# Patient Record
Sex: FEMALE | Race: WHITE | NOT HISPANIC OR LATINO | Employment: STUDENT | URBAN - METROPOLITAN AREA
[De-identification: names, ages, dates, MRNs, and addresses within clinical notes are randomized per-mention and may not be internally consistent; named-entity substitution may affect disease eponyms.]

---
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
## 2023-01-30 ENCOUNTER — HEALTH MAINTENANCE LETTER (OUTPATIENT)
Age: 18
End: 2023-01-30

## 2023-03-03 ENCOUNTER — VIRTUAL VISIT (OUTPATIENT)
Dept: PLASTIC SURGERY | Facility: CLINIC | Age: 18
End: 2023-03-03
Payer: COMMERCIAL

## 2023-03-03 DIAGNOSIS — Z98.890 POSTOPERATIVE STATE: Primary | ICD-10-CM

## 2023-03-03 NOTE — LETTER
3/3/2023       RE: Socorro Giang  [address] [facility] [address]       Dear Colleague,    Thank you for referring your patient, Socorro Giang, to the [facility] at [facility]. Please see a copy of my visit note below.    Facial Plastic and Reconstructive Surgery      Socorro Giang is doing well. She is healing appropriately from nasal surgery.       Again, thank you for allowing me to participate in the care of your patient.      Sincerely,    Calli Fox MD

## 2023-03-03 NOTE — LETTER
November 27, 2023  Re: Socorro Giang  2005      Dear Dr. Camara,    Thank you so much for referring Soocrro Giang to the [facility]. I had the pleasure of visiting with Socorro today.     Attached you will find a copy of my note. Please feel free to reach out to me with any questions, [phone].     Facial Plastic and Reconstructive Surgery      Socorro Giang is doing well. She is healing appropriately from nasal surgery.         Your trust in our practice and care is much appreciated.    Sincerely,    Calli Fox MD

## 2023-03-03 NOTE — PROGRESS NOTES
Facial Plastic and Reconstructive Surgery      Socorro Giang is doing well. She is healing appropriately from nasal surgery.

## 2023-04-28 PROBLEM — M25.559 HIP PAIN: Status: RESOLVED | Noted: 2022-09-07 | Resolved: 2023-04-28

## 2023-07-07 ENCOUNTER — OFFICE VISIT (OUTPATIENT)
Dept: PLASTIC SURGERY | Facility: CLINIC | Age: 18
End: 2023-07-07
Payer: COMMERCIAL

## 2023-07-07 DIAGNOSIS — Z98.890 POSTOPERATIVE STATE: Primary | ICD-10-CM

## 2023-07-07 NOTE — LETTER
July 10, 2023  Re: Socorro Giang  2005      Dear Dr. Camara,    Thank you so much for referring Socorro Giang to the [facility]. I had the pleasure of visiting with Socorro today.     Attached you will find a copy of my note. Please feel free to reach out to me with any questions, [phone].     Facial Plastic and Reconstructive Surgery      Socorro Giang is doing well.  Her breathing is good. She occasionally feels pressure over the bony bridge of her nose and associated discomfort.     On exam the area and nose look good. She is healing well. She states nasal saline helps and so I have encouraged her to continue it.           Your trust in our practice and care is much appreciated.    Sincerely,    Calli Fox MD

## 2023-07-10 NOTE — PROGRESS NOTES
Facial Plastic and Reconstructive Surgery      Socorro Giang is doing well.  Her breathing is good. She occasionally feels pressure over the bony bridge of her nose and associated discomfort.     On exam the area and nose look good. She is healing well. She states nasal saline helps and so I have encouraged her to continue it.

## 2024-03-02 ENCOUNTER — HEALTH MAINTENANCE LETTER (OUTPATIENT)
Age: 19
End: 2024-03-02

## 2025-03-15 ENCOUNTER — HEALTH MAINTENANCE LETTER (OUTPATIENT)
Age: 20
End: 2025-03-15